# Patient Record
Sex: FEMALE | Race: WHITE | NOT HISPANIC OR LATINO | Employment: FULL TIME | URBAN - NONMETROPOLITAN AREA
[De-identification: names, ages, dates, MRNs, and addresses within clinical notes are randomized per-mention and may not be internally consistent; named-entity substitution may affect disease eponyms.]

---

## 2017-01-31 ENCOUNTER — NON-PROVIDER VISIT (OUTPATIENT)
Dept: MEDICAL GROUP | Facility: CLINIC | Age: 32
End: 2017-01-31

## 2017-01-31 DIAGNOSIS — Z02.1 PRE-EMPLOYMENT DRUG SCREENING: ICD-10-CM

## 2017-01-31 PROCEDURE — 8907 PR URINE COLLECT ONLY: Performed by: NURSE PRACTITIONER

## 2018-12-11 ENCOUNTER — NON-PROVIDER VISIT (OUTPATIENT)
Dept: URGENT CARE | Facility: PHYSICIAN GROUP | Age: 33
End: 2018-12-11

## 2018-12-11 PROCEDURE — 8907 PR URINE COLLECT ONLY: Performed by: PHYSICIAN ASSISTANT

## 2020-05-29 ENCOUNTER — OCCUPATIONAL MEDICINE (OUTPATIENT)
Dept: URGENT CARE | Facility: PHYSICIAN GROUP | Age: 35
End: 2020-05-29
Payer: COMMERCIAL

## 2020-05-29 VITALS
RESPIRATION RATE: 18 BRPM | HEART RATE: 98 BPM | TEMPERATURE: 98.6 F | HEIGHT: 72 IN | OXYGEN SATURATION: 97 % | SYSTOLIC BLOOD PRESSURE: 126 MMHG | BODY MASS INDEX: 31.97 KG/M2 | WEIGHT: 236 LBS | DIASTOLIC BLOOD PRESSURE: 80 MMHG
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
## 2020-05-29 DIAGNOSIS — Z23 NEED FOR TETANUS, DIPHTHERIA, AND ACELLULAR PERTUSSIS (TDAP) VACCINE: ICD-10-CM

## 2020-05-29 DIAGNOSIS — S61.259A DOG BITE OF MULTIPLE SITES OF RIGHT HAND AND FINGERS, INITIAL ENCOUNTER: ICD-10-CM

## 2020-05-29 DIAGNOSIS — S61.451A DOG BITE OF MULTIPLE SITES OF RIGHT HAND AND FINGERS, INITIAL ENCOUNTER: ICD-10-CM

## 2020-05-29 PROCEDURE — 90715 TDAP VACCINE 7 YRS/> IM: CPT | Performed by: FAMILY MEDICINE

## 2020-05-29 PROCEDURE — 99204 OFFICE O/P NEW MOD 45 MIN: CPT | Mod: 25 | Performed by: FAMILY MEDICINE

## 2020-05-29 PROCEDURE — 90471 IMMUNIZATION ADMIN: CPT | Performed by: FAMILY MEDICINE

## 2020-05-29 RX ORDER — AMOXICILLIN AND CLAVULANATE POTASSIUM 875; 125 MG/1; MG/1
TABLET, FILM COATED ORAL
Qty: 14 TAB | Refills: 0 | Status: SHIPPED | OUTPATIENT
Start: 2020-05-29 | End: 2022-06-23

## 2020-05-29 SDOH — HEALTH STABILITY: MENTAL HEALTH: HOW OFTEN DO YOU HAVE A DRINK CONTAINING ALCOHOL?: MONTHLY OR LESS

## 2020-05-29 NOTE — LETTER
"EMPLOYEE’S CLAIM FOR COMPENSATION/ REPORT OF INITIAL TREATMENT  FORM C-4    EMPLOYEE’S CLAIM - PROVIDE ALL INFORMATION REQUESTED   First Name  Claudia Last Name  Taylor Birthdate                    1985                Sex  female Claim Number   Home Address  [address] Age  35 y.o. 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 (if applicable)  Getting dog out of a fence    How did this injury or occupational disease occur? (Be specific an answer in detail. Use additional sheet if necessary)  The dog wiggled out of restraints and turned and bit my hand.   If you believe that you have an occupational disease, when did you first have knowledge of the disability and it relationship to your employment?  NA Witnesses to the Accident  Tania DeNoyer      Nature of Injury or Occupational Disease  Workers' Compensation  Part(s) of Body Injured or Affected  Hand (R), N/A, N/A    I certify that the above is true and correct to the best of my knowledge and that I have provided this information in order to obtain the benefits " of [place]  I hereby authorize any physician, chiropractor, surgeon, practitioner, or other person, any hospital, including [facility], any medical service organization, any insurance company, or other institution or organization to release to each other, any medical or other information, including benefits paid or payable, pertinent to this injury or disease, except information relative to diagnosis, treatment and/or counseling for AIDS, psychological conditions, alcohol or controlled substances, for which I must give specific authorization.  A Photostat of this authorization shall be as valid as the original.     Date 05/29/2020   Place [facility]   Employee’s Signature   THIS REPORT MUST BE COMPLETED AND MAILED WITHIN 3 WORKING DAYS OF TREATMENT   Place  [facility] [place]  Name of Facility  [facility]   Date  5/29/2020 Diagnosis  (S61.451A,  S61.259A,  W54.0XXA) Dog bite of multiple sites of right hand and fingers, initial encounter  (Z23) Need for tetanus, diphtheria, and acellular pertussis (Tdap) vaccine Is there evidence the injured employee was under the influence of alcohol and/or another controlled substance at the time of accident?   Hour  6:56 PM Description of Injury or Disease  Diagnoses of Dog bite of multiple sites of right hand and fingers, initial encounter and Need for tetanus, diphtheria, and acellular pertussis (Tdap) vaccine were pertinent to this visit. No   Treatment  Wounds cleansed with Hibiclens and saline. None of the wounds require sutures. Advised these wounds will need to heal with secondary intention. Antibiotic ointment and dressings applied to wounds. She will continue with antibiotic ointment and dressing to wounds with at least daily changes. May use soap and water to wounds. Tetanus immunization status updated - Tdap given.  "Antibiotic prophylaxis with Augmentin prescribed.  Have you advised the patient to remain off work five days or more? No   X-Ray Findings      If Yes   From Date  To Date      From information given by the employee, together with medical evidence, can you directly connect this injury or occupational disease as job incurred?  Yes If No Full Duty Yes Modified Duty      Is additional medical care by a physician indicated?  Yes  Comments:Return on 6/4/2020 or sooner if needed. If Modified Duty, Specify any Limitations / Restrictions  Keep wounds covered at work.   Do you know of any previous injury or disease contributing to this condition or occupational disease?                            No   Date  5/29/2020 Print Doctor’s Name Russ Carrillo M.D. I certify the employer’s copy of  this form was mailed on:   Address  [address] Insurer’s Use Only     [facility]  [address]    Provider’s Tax ID Number  [id number] Telephone  Dept: [phone]        e-SignCHEN, RUSS MCCRAY M.D.   e-Signature: Dr. Jerry Hernandez,   Medical Director Degree  MD        ORIGINAL-TREATING PHYSICIAN OR CHIROPRACTOR    PAGE 2-INSURER/TPA    PAGE 3-EMPLOYER    PAGE 4-EMPLOYEE             Form C-4 (rev.10/07)              BRIEF DESCRIPTION OF RIGHTS AND BENEFITS  (Pursuant to [place] 616C.050)    Notice of Injury or Occupational Disease (Incident Report Form C-1): If an injury or occupational disease (OD) arises out of and in the course of employment, you must provide written notice to your employer as soon as practicable, but no later than 7 days after the accident or OD. Your employer shall maintain a sufficient supply of the required forms.    Claim for Compensation (Form C-4): If medical treatment is sought, the form C-4 is available at the place of initial treatment. A completed \"Claim for Compensation\" (Form C-4) must be filed within 90 days after an accident or OD. The treating physician or chiropractor must, " within 3 working days after treatment, complete and mail to the employer, the employer's insurer and third-party , the Claim for Compensation.    Medical Treatment: If you require medical treatment for your on-the-job injury or OD, you may be required to select a physician or chiropractor from a list provided by your workers’ compensation insurer, if it has contracted with an Organization for Managed Care (MCO) or Preferred Provider Organization (PPO) or providers of health care. If your employer has not entered into a contract with an MCO or PPO, you may select a physician or chiropractor from the Panel of Physicians and Chiropractors. Any medical costs related to your industrial injury or OD will be paid by your insurer.    Temporary Total Disability (TTD): If your doctor has certified that you are unable to work for a period of at least 5 consecutive days, or 5 cumulative days in a 20-day period, or places restrictions on you that your employer does not accommodate, you may be entitled to TTD compensation.    Temporary Partial Disability (TPD): If the wage you receive upon reemployment is less than the compensation for TTD to which you are entitled, the insurer may be required to pay you TPD compensation to make up the difference. TPD can only be paid for a maximum of 24 months.    Permanent Partial Disability (PPD): When your medical condition is stable and there is an indication of a PPD as a result of your injury or OD, within 30 days, your insurer must arrange for an evaluation by a rating physician or chiropractor to determine the degree of your PPD. The amount of your PPD award depends on the date of injury, the results of the PPD evaluation and your age and wage.    Permanent Total Disability (PTD): If you are medically certified by a treating physician or chiropractor as permanently and totally disabled and have been granted a PTD status by your insurer, you are entitled to receive monthly  benefits not to exceed 66 2/3% of your average monthly wage. The amount of your PTD payments is subject to reduction if you previously received a PPD award.    Vocational Rehabilitation Services: You may be eligible for vocational rehabilitation services if you are unable to return to the job due to a permanent physical impairment or permanent restrictions as a result of your injury or occupational disease.    Transportation and Per Gume Reimbursement: You may be eligible for travel expenses and per gume associated with medical treatment.    Reopening: You may be able to reopen your claim if your condition worsens after claim closure.    Appeal Process: If you disagree with a written determination issued by the insurer or the insurer does not respond to your request, you may appeal to the Department of Administration, , by following the instructions contained in your determination letter. You must appeal the determination within 70 days from the date of the determination letter at [address], [address], [place], [place] [address], or [address], [address], [place] [address]. If you disagree with the  decision, you may appeal to the Department of Administration, . You must file your appeal within 30 days from the date of the  decision letter at [address], [address], [place], [place] [address], or [address], [address], [place] [address]. If you disagree with a decision of an , you may file a petition for judicial review with the District Court. You must do so within 30 days of the Appeal Officer’s decision. You may be represented by an  at your own expense or you may contact the [facility] for possible representation.    [place]  for Injured Workers (NAIW): If you disagree with a  decision, you may request that NAIW represent you without charge at an   Hearing. For information regarding denial of benefits, you may contact the [facility] at: [address], [address], [place] [address], [phone], or [address], [address], [place] [address], [phone]    To File a Complaint with the Division: If you wish to file a complaint with the  of the Division of Industrial Relations (DIR),  please contact the Workers’ Compensation Section, [address], [address], [place], [place] [address], telephone [phone]) [phone], or [address], [address], [place], [place] [address], telephone [phone].    For assistance with Workers’ Compensation Issues: You may contact the Office of the Governor Consumer Health Assistance, [address], [address], [place], [place] [address], Toll Free 1-553.584.9297, Web site: http://govcha.[facility].nv., E-mail seven@[facility].nv.                   __________________________________________________________________                                                     ___05/29/2020______        Employee Name / Signature                                                                                                                                              Date                                                                                                                                                                                                     D-2 (rev. 06/18)

## 2020-05-29 NOTE — LETTER
[address] [place] [address]  Phone:  [phone] - Fax:  [phone]   [facility] Progress Report and Disability Certification  Date of Service: 5/29/2020   No Show:  No  Date / Time of Next Visit: 6/4/2020   Claim Information   Patient Name: Claudia Vazquez  Claim Number:     Employer: [employer]  Date of Injury: 5/29/2020     Insurer / TPA: [employer]  ID / SSN:     Occupation: Animal Control  Diagnosis: Diagnoses of Dog bite of multiple sites of right hand and fingers, initial encounter and Need for tetanus, diphtheria, and acellular pertussis (Tdap) vaccine were pertinent to this visit.    Medical Information   Related to Industrial Injury? Yes    Subjective Complaints:  DOI: 5/29/2020. She was trying to get a dog out of a fence, the dog wiggled out of restraints and turned and bit her right hand in multiple locations. Unknown last tetanus immunization, likely was over 5 years ago. She has tolerated Augmentin in the past.   Objective Findings: Dog bites dorsum of right hand between 1st and 2nd MCP joints, radial aspect base of right index finger, ulnar aspect of right index finger in region of DIP joint, and right middle finger. All bites do not have uncontrolled bleeding.   Pre-Existing Condition(s): None.   Assessment:   Initial Visit    Status: Additional Care Required  Comments:Return on 6/4/2020 or sooner if needed.  Permanent Disability:No    Plan: Medication  Comments:See below.    Diagnostics:      Comments:  Wounds cleansed with Hibiclens and saline. None of the wounds require sutures. Advised these wounds will need to heal with secondary intention. Antibiotic ointment and dressings applied to wounds. She will continue with antibiotic ointment and dressi  ng to wounds with at least daily changes. May use soap and water to wounds. Tetanus immunization status updated - Tdap given. Antibiotic prophylaxis with  Augmentin prescribed.    Disability Information   Status: Released to Full Duty    From:  5/29/2020  Through: 6/4/2020 Restrictions are:     Physical Restrictions   Sitting:    Standing:    Stooping:    Bending:      Squatting:    Walking:    Climbing:    Pushing:      Pulling:    Other:    Reaching Above Shoulder (L):   Reaching Above Shoulder (R):       Reaching Below Shoulder (L):    Reaching Below Shoulder (R):      Not to exceed Weight Limits   Carrying(hrs):   Weight Limit(lb):   Lifting(hrs):   Weight  Limit(lb):     Comments: Keep wounds covered at work.    Repetitive Actions   Hands: i.e. Fine Manipulations from Grasping:     Feet: i.e. Operating Foot Controls:     Driving / Operate Machinery:     Physician Name: Russ Carrillo M.D. Physician Signature: RUSS Gaines M.D. e-Signature: Dr. Jerry Hernandez, Medical Director   Clinic Name / Location: [address] [address] Clinic Phone Number: Dept: [phone]   Appointment Time: 6:40 Pm Visit Start Time: 6:56 PM   Check-In Time:  6:51 Pm Visit Discharge Time: 7:31 PM   Original-Treating Physician or Chiropractor    Page 2-Insurer/TPA    Page 3-Employer    Page 4-Employee

## 2020-05-30 NOTE — PROGRESS NOTES
Chief Complaint:    Chief Complaint   Patient presents with   • Dog Bite     WC new-dog was stuck in wire and and dog bit (R) hand        History of Present Illness:    DOI: 5/29/2020. She was trying to get a dog out of a fence, the dog wiggled out of restraints and turned and bit her right hand in multiple locations. Unknown last tetanus immunization, likely was over 5 years ago. She has tolerated Augmentin in the past.      Review of Systems:    Constitutional: Negative for fever, chills, and diaphoresis.   Eyes: Negative for change in vision, photophobia, pain, redness, and discharge.  ENT: Negative for ear pain, ear discharge, hearing loss, tinnitus, nasal congestion, nosebleeds, and sore throat.    Respiratory: Negative for cough, hemoptysis, sputum production, shortness of breath, wheezing, and stridor.    Cardiovascular: Negative for chest pain, palpitations, orthopnea, claudication, leg swelling, and PND.   Gastrointestinal: Negative for abdominal pain, nausea, vomiting, diarrhea, constipation, blood in stool, and melena.   Genitourinary: Negative for dysuria, urinary urgency, urinary frequency, hematuria, and flank pain.   Musculoskeletal: See HPI.  Skin: See HPI.  Neurological: Negative for dizziness, tingling, tremors, sensory change, speech change, focal weakness, seizures, loss of consciousness, and headaches.   Endo: Negative for polydipsia.   Heme: Does not bruise/bleed easily.   Psychiatric/Behavioral: Negative for depression, suicidal ideas, hallucinations, memory loss and substance abuse. The patient is not nervous/anxious and does not have insomnia.        Past Medical History:    No past medical history on file.    Past Surgical History:    No past surgical history on file.    Social History:    Social History     Socioeconomic History   • Marital status:      Spouse name: Not on file   • Number of children: Not on file   • Years of education: Not on file   • Highest education level: Not  "on file   Occupational History   • Not on file   Social Needs   • Financial resource strain: Not on file   • Food insecurity     Worry: Not on file     Inability: Not on file   • Transportation needs     Medical: Not on file     Non-medical: Not on file   Tobacco Use   • Smoking status: Former Smoker     Types: Cigarettes     Last attempt to quit: 2019     Years since quittin.4   • Smokeless tobacco: Never Used   Substance and Sexual Activity   • Alcohol use: Not Currently     Frequency: Monthly or less   • Drug use: Not Currently   • Sexual activity: Not on file   Lifestyle   • Physical activity     Days per week: Not on file     Minutes per session: Not on file   • Stress: Not on file   Relationships   • Social connections     Talks on phone: Not on file     Gets together: Not on file     Attends Adventism service: Not on file     Active member of club or organization: Not on file     Attends meetings of clubs or organizations: Not on file     Relationship status: Not on file   • Intimate partner violence     Fear of current or ex partner: Not on file     Emotionally abused: Not on file     Physically abused: Not on file     Forced sexual activity: Not on file   Other Topics Concern   • Not on file   Social History Narrative   • Not on file     Family History:    No family history on file.    Medications:    No current outpatient medications on file prior to visit.     No current facility-administered medications on file prior to visit.      Allergies:    No Known Allergies      Vitals:    Vitals:    20 1858   BP: 126/80   Pulse: 98   Resp: 18   Temp: 37 °C (98.6 °F)   SpO2: 97%   Weight: 107 kg (236 lb)   Height: 1.854 m (6' 1\")       Physical Exam:    Constitutional: Vital signs reviewed. Appears well-developed and well-nourished. No acute distress.   Eyes: Sclera white, conjunctivae clear.  ENT: External ears normal. Hearing normal.  Cardiovascular: Peripheral pulses 2+.   Pulmonary/Chest: " Respirations non-labored.  Musculoskeletal: Normal gait. Normal range of motion. No muscular atrophy or weakness.  Neurological: Alert and oriented to person, place, and time. Muscle tone normal. Coordination normal. Light touch and sensation normal.   Skin: Dog bites dorsum of right hand between 1st and 2nd MCP joints, radial aspect base of right index finger, ulnar aspect of right index finger in region of DIP joint, and right middle finger. All bites do not have uncontrolled bleeding.  Psychiatric: Normal mood and affect. Behavior is normal. Judgment and thought content normal.       Assessment / Plan:    1. Dog bite of multiple sites of right hand and fingers, initial encounter  - Tdap Vaccine =>6YO IM  - amoxicillin-clavulanate (AUGMENTIN) 875-125 MG Tab; 1 TAB BY MOUTH TWICE A DAY X 7 DAYS. TAKE WITH FOOD.  Dispense: 14 Tab; Refill: 0    2. Need for tetanus, diphtheria, and acellular pertussis (Tdap) vaccine  - Tdap Vaccine =>6YO IM      Discussed with her DDX, management options, and risks, benefits, and alternatives to treatment plan agreed upon.    Full duty. Keep wounds covered at work.    Wounds cleansed with Hibiclens and saline. None of the wounds require sutures. Advised these wounds will need to heal with secondary intention. Antibiotic ointment and dressings applied to wounds. She will continue with antibiotic ointment and dressing to wounds with at least daily changes. May use soap and water to wounds. Tetanus immunization status updated - Tdap given. Antibiotic prophylaxis with Augmentin prescribed.    Return on 6/4/2020 or sooner if needed.

## 2020-06-04 ENCOUNTER — OCCUPATIONAL MEDICINE (OUTPATIENT)
Dept: URGENT CARE | Facility: PHYSICIAN GROUP | Age: 35
End: 2020-06-04
Payer: COMMERCIAL

## 2020-06-04 VITALS
SYSTOLIC BLOOD PRESSURE: 126 MMHG | RESPIRATION RATE: 16 BRPM | HEART RATE: 67 BPM | TEMPERATURE: 97.8 F | OXYGEN SATURATION: 99 % | DIASTOLIC BLOOD PRESSURE: 70 MMHG
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
## 2020-06-04 DIAGNOSIS — S61.451D DOG BITE OF RIGHT HAND, SUBSEQUENT ENCOUNTER: ICD-10-CM

## 2020-06-04 PROCEDURE — 99213 OFFICE O/P EST LOW 20 MIN: CPT | Performed by: FAMILY MEDICINE

## 2020-06-04 ASSESSMENT — ENCOUNTER SYMPTOMS
FOCAL WEAKNESS: 0
SENSORY CHANGE: 0

## 2020-06-04 NOTE — LETTER
[address] [place] [address]  Phone:  [phone] - Fax:  [phone]   [facility] Progress Report and Disability Certification  Date of Service: 6/4/2020   No Show:  No  Date / Time of Next Visit:  discharge MMI   Claim Information   Patient Name: Claudia Vazquez  Claim Number:     Employer: [employer]  Date of Injury: 5/29/2020     Insurer / TPA: [employer]  ID / SSN:     Occupation: Animal Control  Diagnosis: The encounter diagnosis was Dog bite of right hand, subsequent encounter.    Medical Information   Related to Industrial Injury?      Subjective Complaints:  DOI: 5/29/2020  F/u visit right hand dog bite. She is works [employer]. Doing well. Pain, swelling, and bruising much improved. No expanding redness or drainage. No OTC medications. No other aggravating or alleviating factors.     Objective Findings: R hand: well healing dorsal lacerations without evidence of infection. Distal neuro/vascular intact.      Pre-Existing Condition(s):     Assessment:   Condition Improved    Status: Discharged /  MMI  Permanent Disability:     Plan:   Comments:finish augmentin    Diagnostics:      Comments:       Disability Information   Status: Released to Full Duty    From:     Through:   Restrictions are:     Physical Restrictions   Sitting:    Standing:    Stooping:    Bending:      Squatting:    Walking:    Climbing:    Pushing:      Pulling:    Other:    Reaching Above Shoulder (L):   Reaching Above Shoulder (R):       Reaching Below Shoulder (L):    Reaching Below Shoulder (R):      Not to exceed Weight Limits   Carrying(hrs):   Weight Limit(lb):   Lifting(hrs):   Weight  Limit(lb):     Comments:      Repetitive Actions   Hands: i.e. Fine Manipulations from Grasping:     Feet: i.e. Operating Foot Controls:     Driving / Operate Machinery:     Physician Name: Martin De La Torre M.D. Physician Signature:  rita-OANH Wilder M.D. e-Signature: Dr. Jerry Hernandez, Medical Director   Clinic Name / Location: [address] [address] Clinic Phone Number: Dept: [phone]   Appointment Time: 1:40 Pm Visit Start Time: 2:20 PM   Check-In Time:  1:50 Pm Visit Discharge Time: 2:36 PM   Original-Treating Physician or Chiropractor    Page 2-Insurer/TPA    Page 3-Employer    Page 4-Employee

## 2020-06-04 NOTE — PROGRESS NOTES
Subjective:      Claudia Vazquez is a 35 y.o. female who presents with Follow-Up ( fv-dog bite (R) hand, no issues)      DOI: 5/29/2020  F/u visit right hand dog bite. She is works [employer]. Doing well. Pain, swelling, and bruising much improved. No expanding redness or drainage. No OTC medications. No other aggravating or alleviating factors.       HPI    Review of Systems   Musculoskeletal: Negative for joint pain.   Skin: Negative for itching and rash.   Neurological: Negative for sensory change and focal weakness.          Objective:     /70   Pulse 67   Temp 36.6 °C (97.8 °F)   Resp 16   SpO2 99%      Physical Exam  Constitutional:       Appearance: Normal appearance.   Musculoskeletal:         General: No swelling or tenderness.   Skin:     General: Skin is warm and dry.   Neurological:      General: No focal deficit present.      Mental Status: She is alert and oriented to person, place, and time.         R hand: well healing dorsal lacerations without evidence of infection. Distal neuro/vascular intact.          Assessment/Plan:       1. Dog bite of right hand, subsequent encounter    Differential diagnosis, natural history, supportive care, and indications for immediate follow-up discussed at length.     Doing well  Complete augmentin  Discharge MMI

## 2022-06-23 ENCOUNTER — OFFICE VISIT (OUTPATIENT)
Dept: URGENT CARE | Facility: PHYSICIAN GROUP | Age: 37
End: 2022-06-23
Payer: COMMERCIAL

## 2022-06-23 ENCOUNTER — HOSPITAL ENCOUNTER (OUTPATIENT)
Facility: MEDICAL CENTER | Age: 37
End: 2022-06-23
Attending: PHYSICIAN ASSISTANT
Payer: COMMERCIAL

## 2022-06-23 VITALS
SYSTOLIC BLOOD PRESSURE: 128 MMHG | BODY MASS INDEX: 31.83 KG/M2 | RESPIRATION RATE: 14 BRPM | DIASTOLIC BLOOD PRESSURE: 78 MMHG | OXYGEN SATURATION: 100 % | TEMPERATURE: 97.6 F | WEIGHT: 235 LBS | HEART RATE: 88 BPM | HEIGHT: 72 IN

## 2022-06-23 DIAGNOSIS — R30.0 DYSURIA: ICD-10-CM

## 2022-06-23 LAB
APPEARANCE UR: CLEAR
BILIRUB UR STRIP-MCNC: NORMAL MG/DL
CANDIDA DNA VAG QL PROBE+SIG AMP: NEGATIVE
COLOR UR AUTO: YELLOW
G VAGINALIS DNA VAG QL PROBE+SIG AMP: NEGATIVE
GLUCOSE UR STRIP.AUTO-MCNC: NORMAL MG/DL
KETONES UR STRIP.AUTO-MCNC: NORMAL MG/DL
LEUKOCYTE ESTERASE UR QL STRIP.AUTO: NORMAL
NITRITE UR QL STRIP.AUTO: NORMAL
PH UR STRIP.AUTO: 5 [PH] (ref 5–8)
PROT UR QL STRIP: NORMAL MG/DL
RBC UR QL AUTO: NORMAL
SP GR UR STRIP.AUTO: 1.02
T VAGINALIS DNA VAG QL PROBE+SIG AMP: NEGATIVE
UROBILINOGEN UR STRIP-MCNC: 0.2 MG/DL

## 2022-06-23 PROCEDURE — 87491 CHLMYD TRACH DNA AMP PROBE: CPT

## 2022-06-23 PROCEDURE — 87510 GARDNER VAG DNA DIR PROBE: CPT

## 2022-06-23 PROCEDURE — 87591 N.GONORRHOEAE DNA AMP PROB: CPT

## 2022-06-23 PROCEDURE — 99214 OFFICE O/P EST MOD 30 MIN: CPT | Performed by: PHYSICIAN ASSISTANT

## 2022-06-23 PROCEDURE — 87660 TRICHOMONAS VAGIN DIR PROBE: CPT

## 2022-06-23 PROCEDURE — 87480 CANDIDA DNA DIR PROBE: CPT

## 2022-06-23 PROCEDURE — 81002 URINALYSIS NONAUTO W/O SCOPE: CPT | Performed by: PHYSICIAN ASSISTANT

## 2022-06-23 ASSESSMENT — ENCOUNTER SYMPTOMS
SORE THROAT: 0
DIARRHEA: 0
EYE REDNESS: 0
SHORTNESS OF BREATH: 0
FEVER: 0
CHILLS: 0
EYE PAIN: 0
ABDOMINAL PAIN: 0
EYE DISCHARGE: 0
HEADACHES: 0
CONSTIPATION: 0
DIAPHORESIS: 0
DIZZINESS: 0
VOMITING: 0
COUGH: 0
NAUSEA: 0
SINUS PAIN: 0
WHEEZING: 0

## 2022-06-23 NOTE — PROGRESS NOTES
"  Subjective:     Claudia Vazquez  is a 37 y.o. female who presents for UTI (Painful urination )       She presents today with dysuria that has been present since May.  She denies frequency, urgency, hematuria, vaginal discharge, vaginal erythema, abdominal pain.  She denies previous history of STDs.  She does have a new sexual partner that began in February.  Denies flank pain.      Review of Systems   Constitutional: Negative for chills, diaphoresis, fever and malaise/fatigue.   HENT: Negative for congestion, ear discharge, sinus pain and sore throat.    Eyes: Negative for pain, discharge and redness.   Respiratory: Negative for cough, shortness of breath and wheezing.    Cardiovascular: Negative for chest pain.   Gastrointestinal: Negative for abdominal pain, constipation, diarrhea, nausea and vomiting.   Genitourinary: Positive for dysuria. Negative for frequency and urgency.   Neurological: Negative for dizziness and headaches.      No Known Allergies  No past medical history on file.     Objective:   /78 (BP Location: Right arm, Patient Position: Sitting, BP Cuff Size: Adult)   Pulse 88   Temp 36.4 °C (97.6 °F) (Temporal)   Resp 14   Ht 1.854 m (6' 1\")   Wt 107 kg (235 lb) Comment: per pt  SpO2 100%   BMI 31.00 kg/m²   Physical Exam  Vitals and nursing note reviewed.   Constitutional:       General: She is not in acute distress.     Appearance: She is not ill-appearing or toxic-appearing.   HENT:      Head: Normocephalic.      Nose: No rhinorrhea.   Eyes:      General: No scleral icterus.     Conjunctiva/sclera: Conjunctivae normal.   Cardiovascular:      Rate and Rhythm: Normal rate and regular rhythm.   Pulmonary:      Effort: Pulmonary effort is normal. No respiratory distress.      Breath sounds: Normal breath sounds. No stridor.   Abdominal:      General: Abdomen is flat. Bowel sounds are normal. There is no distension.      Palpations: Abdomen is soft.      Tenderness: There is no abdominal " tenderness. There is no right CVA tenderness, left CVA tenderness or guarding.   Musculoskeletal:      Cervical back: Neck supple.   Neurological:      Mental Status: She is alert and oriented to person, place, and time.   Psychiatric:         Mood and Affect: Mood normal.         Behavior: Behavior normal.         Thought Content: Thought content normal.         Judgment: Judgment normal.             Diagnostic testing:    POC urinalysis-all within normal limits     Vaginal pathogen swab-pending     Gonorrhea/chlamydia swab-pending    Assessment/Plan:     Encounter Diagnoses   Name Primary?   • Dysuria         Plan for care for today's complaint includes Testing for STDs and vaginal pathogens, urology referral.  She should continue to monitor symptoms, if they are unchanged and she should return to the urgent care for reevaluation.  I will contact her via phone call to discuss results of her testing.  She should withhold from sexual contacts until the results of the testing returned..   Referral to urology placed    See AVS Instructions below for written guidance provided to patient on after-visit management and care in addition to our verbal discussion during the visit.    Please note that this dictation was created using voice recognition software. I have attempted to correct all errors, but there may be sound-alike, spelling, grammar and possibly content errors that I did not discover before finalizing the note.    Loudonchristina Orourke PA-C

## 2022-06-24 ENCOUNTER — TELEPHONE (OUTPATIENT)
Dept: URGENT CARE | Facility: PHYSICIAN GROUP | Age: 37
End: 2022-06-24
Payer: COMMERCIAL

## 2022-06-24 LAB
C TRACH DNA GENITAL QL NAA+PROBE: NEGATIVE
N GONORRHOEA DNA GENITAL QL NAA+PROBE: NEGATIVE
SPECIMEN SOURCE: NORMAL

## 2022-06-25 NOTE — TELEPHONE ENCOUNTER
Did contact the patient via phone call today to discuss results of the gonorrhea/chlamydia testing and the vaginal pathogens testing.  All of the findings for this were normal.  I will send a referral to urology at this time for further evaluation of her dysuria.

## 2022-12-06 ENCOUNTER — NON-PROVIDER VISIT (OUTPATIENT)
Dept: URGENT CARE | Facility: PHYSICIAN GROUP | Age: 37
End: 2022-12-06

## 2022-12-06 DIAGNOSIS — Z02.1 PRE-EMPLOYMENT DRUG SCREENING: ICD-10-CM

## 2022-12-06 PROCEDURE — 8907 PR URINE COLLECT ONLY: Performed by: FAMILY MEDICINE
